# Patient Record
Sex: FEMALE | Race: WHITE
[De-identification: names, ages, dates, MRNs, and addresses within clinical notes are randomized per-mention and may not be internally consistent; named-entity substitution may affect disease eponyms.]

---

## 2018-10-12 ENCOUNTER — HOSPITAL ENCOUNTER (OUTPATIENT)
Dept: HOSPITAL 89 - MAMO | Age: 43
End: 2018-10-12
Attending: FAMILY MEDICINE
Payer: COMMERCIAL

## 2018-10-12 DIAGNOSIS — Z12.31: Primary | ICD-10-CM

## 2018-10-12 DIAGNOSIS — R92.8: ICD-10-CM

## 2018-10-12 PROCEDURE — 77067 SCR MAMMO BI INCL CAD: CPT

## 2018-10-12 PROCEDURE — 77063 BREAST TOMOSYNTHESIS BI: CPT

## 2018-10-12 NOTE — RADIOLOGY IMAGING REPORT
FACILITY: [facility] 

 

PATIENT NAME: MARYBEL GARCIA

: [id number]

MR: [id number]

V: [id number]

EXAM DATE: 

ORDERING PHYSICIAN: HANNAH VALENZUELA

TECHNOLOGIST: Daisy Jameson

 

PROCEDURE:BILATERAL DIGITAL SCREENING MAMMOGRAM WITH CAD ASSISTED 

INTERPRETATION & 3D TOMOSYNTHESIS 

 

COMPARISON:Prior mammograms 17, 16, 10/15/14.

 

INDICATIONS:SCREENING

 

FINDINGS:  

Moderately dense fibroglandular tissue is seen throughout the breasts. 

Most of the parenchymal pattern has remained stable allowing for 

difference in mammographic technique & patient positioning. There are 

2 focal areas of increased density in the upper portion of the Right 

breast in the middle 1/3 on the Right MLO view for which Spot 

compression view is recommended. There is a nodular area of increased 

density in the lateral portion of the Left breast on the Left CC view 

in the middle 1/3 appears to be in the upper portion of the Left breast 

on the Left MLO view. Spot compression views of this area also 

recommended.

 

DIAGNOSTIC CATEGORY 0--INCOMPLETE: NEED ADDITIONAL IMAGING EVALUATION.  

 

 

RECOMMENDATIONS:

ADDITIONAL MAMMOGRAPHIC VIEWS REQUIRED: BILATERAL BREASTS.    

 

IMPRESSION: 

BIRADS 0: Incomplete.

Additional views of both breasts recommended.

 

 

 

 

 

 

 

 

Dictated by:  Dominique Snadra M.D. on 10/12/2018 at 9:18   

Transcribed by: FIX on 10/12/2018 at 9:35    

Approved by:  Dominique Sandra M.D. on 10/12/2018 at 10:14   

[facility]

## 2018-10-16 ENCOUNTER — HOSPITAL ENCOUNTER (OUTPATIENT)
Dept: HOSPITAL 89 - MAMO | Age: 43
End: 2018-10-16
Attending: FAMILY MEDICINE
Payer: COMMERCIAL

## 2018-10-16 DIAGNOSIS — Z12.31: Primary | ICD-10-CM

## 2018-10-16 PROCEDURE — 77066 DX MAMMO INCL CAD BI: CPT

## 2018-10-16 PROCEDURE — 77062 BREAST TOMOSYNTHESIS BI: CPT

## 2018-10-17 NOTE — RADIOLOGY IMAGING REPORT
FACILITY: [facility] 

 

PATIENT NAME: MARYBLE GARCIA

: [id number]

MR: [id number]

V: [id number]

EXAM DATE: [id number]

ORDERING PHYSICIAN: HANNAH VALENZUELA

TECHNOLOGIST: Daisy Jameson

 

PROCEDURE:BILATERAL DIAGNOSTIC DIGITAL MAMMOGRAM WITH CAD ASSISTED 

INTERPRETATION & 3D TOMOSYNTHESIS 

 

COMPARISON:Prior mammograms 10/12/18, 17, 16, 10/15/14.

 

INDICATIONS:further evaluation

 

FINDINGS:  

The patient returns for Spot compression views in the Left CC, Right 

MLO, and Left MLO views in addition to a rolled Right MLO view.

The focal areas of increased density in the upper portion of the Right 

breast on the recent Right MLO view in addition to the nodular area in 

the lateral portion of the Left breast & Left CC view and the upper 

portion Left breast on Left MLO view appear compressible and dissipated 

on the additional images. There was no demonstration of malignant 

appearing mass or calcification in either breast.

 

DIAGNOSTIC CATEGORY 2--BENIGN FINDING.  

 

RECOMMENDATIONS:

ROUTINE MAMMOGRAM AND CLINICAL EVALUATION.   

 

IMPRESSION:

BIRADS 2: Benign finding. 

No significant abnormality is seen.

 

 

 

 

 

 

 

 

 

Dictated by:  Dominique Sandra M.D. on 10/16/2018 at 15:10   

Transcribed by: FIX on 10/16/2018 at 15:48    

Approved by:  Dominique Sandra M.D. on 10/17/2018 at 10:03   

[facility]